# Patient Record
Sex: FEMALE | Race: ASIAN | URBAN - METROPOLITAN AREA
[De-identification: names, ages, dates, MRNs, and addresses within clinical notes are randomized per-mention and may not be internally consistent; named-entity substitution may affect disease eponyms.]

---

## 2024-11-26 ENCOUNTER — APPOINTMENT (RX ONLY)
Dept: URBAN - METROPOLITAN AREA CLINIC 77 | Facility: CLINIC | Age: 34
Setting detail: DERMATOLOGY
End: 2024-11-26

## 2024-11-26 DIAGNOSIS — L71.8 OTHER ROSACEA: ICD-10-CM

## 2024-11-26 PROCEDURE — ? PRESCRIPTION

## 2024-11-26 PROCEDURE — 99203 OFFICE O/P NEW LOW 30 MIN: CPT

## 2024-11-26 PROCEDURE — ? PRESCRIPTION MEDICATION MANAGEMENT

## 2024-11-26 PROCEDURE — ? COUNSELING

## 2024-11-26 PROCEDURE — ? CHRONOLOGY OF PRESENT ILLNESS

## 2024-11-26 RX ORDER — SODIUM SULFACETAMIDE 100 MG/G
THIN LAYER LIQUID TOPICAL BID
Qty: 227 | Refills: 8 | Status: CANCELLED | COMMUNITY
Start: 2024-11-26

## 2024-11-26 RX ORDER — METRONIDAZOLE 7.5 MG/G
THIN LAYER CREAM TOPICAL BID
Qty: 45 | Refills: 7 | Status: ERX | COMMUNITY
Start: 2024-11-26

## 2024-11-26 RX ADMIN — SODIUM SULFACETAMIDE THIN LAYER: 100 LIQUID TOPICAL at 00:00

## 2024-11-26 RX ADMIN — METRONIDAZOLE THIN LAYER: 7.5 CREAM TOPICAL at 00:00

## 2024-11-26 NOTE — PROCEDURE: CHRONOLOGY OF PRESENT ILLNESS
Chronology Of Present Illness: 11/26/24\\nPt reports that she was diagnosed with rosacea 4 years ago. She used metronidazole cream and sulfacetamide and said these worked well. She notes that her rosacea flared when she ran out of the meds. Will send metronidazole cream and sulfacetamide face wash. Photos taken. Pt will f/u in 3 months
Detail Level: Zone

## 2024-11-26 NOTE — PROCEDURE: PRESCRIPTION MEDICATION MANAGEMENT
Plan: Metronidazole Cream QAM\\nSulfacetamide face wash BID
Detail Level: Zone
Render In Strict Bullet Format?: No

## 2024-11-27 ENCOUNTER — RX ONLY (OUTPATIENT)
Age: 34
Setting detail: RX ONLY
End: 2024-11-27

## 2024-11-27 RX ORDER — SODIUM SULFACETAMIDE 100 MG/G
THIN LAYER LIQUID TOPICAL BID
Qty: 227 | Refills: 8 | Status: ERX

## 2024-12-02 ENCOUNTER — RX ONLY (RX ONLY)
Age: 34
End: 2024-12-02

## 2024-12-02 RX ORDER — SULFACETAMIDE SODIUM 98 MG/G
THIN LAYER LOTION TOPICAL BID
Qty: 30 | Refills: 2 | Status: ERX | COMMUNITY
Start: 2024-12-02

## 2024-12-02 RX ORDER — SULFACETAMIDE SODIUM 98 MG/G
THIN LAYER LOTION TOPICAL
Qty: 45 | Refills: 3 | Status: ERX

## 2024-12-11 ENCOUNTER — RX ONLY (RX ONLY)
Age: 34
End: 2024-12-11

## 2024-12-11 RX ORDER — TRIAMCINOLONE ACETONIDE 1 MG/G
THIN LAYER CREAM TOPICAL BID
Qty: 45 | Refills: 0 | Status: ERX | COMMUNITY
Start: 2024-12-11

## 2025-02-25 ENCOUNTER — APPOINTMENT (OUTPATIENT)
Dept: URBAN - METROPOLITAN AREA CLINIC 77 | Facility: CLINIC | Age: 35
Setting detail: DERMATOLOGY
End: 2025-02-25

## 2025-02-25 DIAGNOSIS — L71.8 OTHER ROSACEA: ICD-10-CM | Status: INADEQUATELY CONTROLLED

## 2025-02-25 PROCEDURE — ? COUNSELING

## 2025-02-25 PROCEDURE — ? CHRONOLOGY OF PRESENT ILLNESS

## 2025-02-25 PROCEDURE — 99214 OFFICE O/P EST MOD 30 MIN: CPT

## 2025-02-25 PROCEDURE — ? PRESCRIPTION MEDICATION MANAGEMENT

## 2025-02-25 PROCEDURE — ? PRESCRIPTION

## 2025-02-25 RX ORDER — AZELAIC ACID 0.15 G/G
THIN LAYER GEL TOPICAL QD
Qty: 50 | Refills: 0 | Status: ERX | COMMUNITY
Start: 2025-02-25

## 2025-02-25 RX ADMIN — AZELAIC ACID THIN LAYER: 0.15 GEL TOPICAL at 00:00

## 2025-02-25 NOTE — PROCEDURE: CHRONOLOGY OF PRESENT ILLNESS
Chronology Of Present Illness: 11/26/24\\nPt reports that she was diagnosed with rosacea 4 years ago. She used metronidazole cream and sulfacetamide and said these worked well. She notes that her rosacea flared when she ran out of the meds. Will send metronidazole cream and sulfacetamide face wash. Photos taken. Pt will f/u in 3 months\\n\\n2/25/25\\nPt presents with rosacea f/u. Pt was using metronidazole and sulfacetamide.\\nPt reports experiencing an allergic reaction to either medication, unknown exact. Pictures in chart of rash to torso the day after restarting her medications. Recommend against using metronidazole or sulfa wash in the future due to rash. Discussed [facility] compound for clinical appearance today and patient would like to move forward. Will prescribe azelaic acid with oxymetazoline, nicotinamide, and ivermectin through [facility]. F/u for further management. If no improvement, will place referral from laser treatment.
Detail Level: Zone

## 2025-02-25 NOTE — PROCEDURE: PRESCRIPTION MEDICATION MANAGEMENT
Initiate Treatment: azelaic acid 15 % topical gel: Apply thin layer to AA QD until clear, then PRN
Detail Level: Zone
Render In Strict Bullet Format?: No

## 2025-05-06 ENCOUNTER — APPOINTMENT (OUTPATIENT)
Dept: URBAN - METROPOLITAN AREA CLINIC 77 | Facility: CLINIC | Age: 35
Setting detail: DERMATOLOGY
End: 2025-05-06

## 2025-05-06 DIAGNOSIS — L71.8 OTHER ROSACEA: ICD-10-CM

## 2025-05-06 PROCEDURE — ? COUNSELING

## 2025-05-06 PROCEDURE — 99214 OFFICE O/P EST MOD 30 MIN: CPT

## 2025-05-06 PROCEDURE — ? PRESCRIPTION MEDICATION MANAGEMENT

## 2025-05-06 PROCEDURE — ? CHRONOLOGY OF PRESENT ILLNESS

## 2025-05-06 NOTE — PROCEDURE: CHRONOLOGY OF PRESENT ILLNESS
Chronology Of Present Illness: 11/26/24\\nPt reports that she was diagnosed with rosacea 4 years ago. She used metronidazole cream and sulfacetamide and said these worked well. She notes that her rosacea flared when she ran out of the meds. Will send metronidazole cream and sulfacetamide face wash. Photos taken. Pt will f/u in 3 months\\n\\n2/25/25\\nPt presents with rosacea f/u. Pt was using metronidazole and sulfacetamide.\\nPt reports experiencing an allergic reaction to either medication, unknown exact. Pictures in chart of rash to torso the day after restarting her medications. Recommend against using metronidazole or sulfa wash in the future due to rash. Discussed [facility] compound for clinical appearance today and patient would like to move forward. Will prescribe azelaic acid with oxymetazoline, nicotinamide, and ivermectin through [facility]. F/u for further management. If no improvement, will place referral from laser treatment.\\n\\n5/6/25\\nPt presents with rosacea f/u. Pt is using azelaic acid compound from [email or web address] and sulfacetamide wash. Improvement on today’s exam. Pt notes to having a laser appt at the end of the month for residual telangiectasias. Photos taken on exam. Will f/u in 6 months.
Detail Level: Zone